# Patient Record
Sex: FEMALE | Race: WHITE | NOT HISPANIC OR LATINO | URBAN - METROPOLITAN AREA
[De-identification: names, ages, dates, MRNs, and addresses within clinical notes are randomized per-mention and may not be internally consistent; named-entity substitution may affect disease eponyms.]

---

## 2021-11-30 ENCOUNTER — OUTPATIENT (OUTPATIENT)
Dept: OUTPATIENT SERVICES | Facility: HOSPITAL | Age: 41
LOS: 1 days | End: 2021-11-30

## 2023-01-26 PROBLEM — Z00.00 ENCOUNTER FOR PREVENTIVE HEALTH EXAMINATION: Status: ACTIVE | Noted: 2023-01-26

## 2023-02-02 ENCOUNTER — APPOINTMENT (OUTPATIENT)
Dept: CARDIOLOGY | Facility: CLINIC | Age: 43
End: 2023-02-02
Payer: COMMERCIAL

## 2023-02-02 VITALS
HEART RATE: 72 BPM | WEIGHT: 186 LBS | OXYGEN SATURATION: 98 % | HEIGHT: 63 IN | SYSTOLIC BLOOD PRESSURE: 120 MMHG | DIASTOLIC BLOOD PRESSURE: 80 MMHG | BODY MASS INDEX: 32.96 KG/M2

## 2023-02-02 DIAGNOSIS — Z78.9 OTHER SPECIFIED HEALTH STATUS: ICD-10-CM

## 2023-02-02 DIAGNOSIS — Z09 ENCOUNTER FOR FOLLOW-UP EXAMINATION AFTER COMPLETED TREATMENT FOR CONDITIONS OTHER THAN MALIGNANT NEOPLASM: ICD-10-CM

## 2023-02-02 DIAGNOSIS — R07.9 CHEST PAIN, UNSPECIFIED: ICD-10-CM

## 2023-02-02 DIAGNOSIS — Z95.828 PRESENCE OF OTHER VASCULAR IMPLANTS AND GRAFTS: ICD-10-CM

## 2023-02-02 DIAGNOSIS — J98.19 OTHER PULMONARY COLLAPSE: ICD-10-CM

## 2023-02-02 DIAGNOSIS — Z87.898 PERSONAL HISTORY OF OTHER SPECIFIED CONDITIONS: ICD-10-CM

## 2023-02-02 DIAGNOSIS — Z87.820 PERSONAL HISTORY OF TRAUMATIC BRAIN INJURY: ICD-10-CM

## 2023-02-02 DIAGNOSIS — M94.0 CHONDROCOSTAL JUNCTION SYNDROME [TIETZE]: ICD-10-CM

## 2023-02-02 PROCEDURE — 99204 OFFICE O/P NEW MOD 45 MIN: CPT

## 2023-02-02 RX ORDER — CYCLOBENZAPRINE HYDROCHLORIDE 5 MG/1
5 TABLET, FILM COATED ORAL
Refills: 0 | Status: ACTIVE | COMMUNITY

## 2023-02-02 NOTE — PHYSICAL EXAM
[Normal S1, S2] : normal S1, S2 [Murmur] : murmur [Normal] : moves all extremities, no focal deficits, normal speech [de-identified] : No carotid bruits auscultated bilaterally [de-identified] : faint systolic ejection murmur base of heart
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
## 2023-02-02 NOTE — REVIEW OF SYSTEMS
[Joint Pain] : joint pain [Joint Stiffness] : joint stiffness [Negative] : Gastrointestinal [FreeTextEntry3] : see HPI [FreeTextEntry5] : see HPI [de-identified] : see HPI

## 2023-02-02 NOTE — DISCUSSION/SUMMARY
[FreeTextEntry1] : 1. Chest Pain: appears non-cardiac. Given the history of the IVC filter and no antiplatelet therapy, I am ordering a CTA of the chest to evaluate for PE. She has a systolic murmur on examination, so I am recommending an echocardiogram as well.\par \par Office will call with results. \par \par Follow up in 1 year.

## 2023-02-24 ENCOUNTER — APPOINTMENT (OUTPATIENT)
Dept: CARDIOLOGY | Facility: CLINIC | Age: 43
End: 2023-02-24

## 2023-03-13 ENCOUNTER — APPOINTMENT (OUTPATIENT)
Dept: CARDIOLOGY | Facility: CLINIC | Age: 43
End: 2023-03-13
Payer: COMMERCIAL

## 2023-03-13 PROCEDURE — 93306 TTE W/DOPPLER COMPLETE: CPT

## 2024-02-06 ENCOUNTER — APPOINTMENT (OUTPATIENT)
Dept: CARDIOLOGY | Facility: CLINIC | Age: 44
End: 2024-02-06

## 2025-05-29 ENCOUNTER — APPOINTMENT (OUTPATIENT)
Dept: ULTRASOUND IMAGING | Facility: CLINIC | Age: 45
End: 2025-05-29
Payer: COMMERCIAL

## 2025-05-30 PROCEDURE — 76536 US EXAM OF HEAD AND NECK: CPT

## 2025-06-04 ENCOUNTER — APPOINTMENT (OUTPATIENT)
Dept: ULTRASOUND IMAGING | Facility: CLINIC | Age: 45
End: 2025-06-04
Payer: COMMERCIAL

## 2025-06-04 PROCEDURE — 76830 TRANSVAGINAL US NON-OB: CPT

## 2025-06-04 PROCEDURE — 76856 US EXAM PELVIC COMPLETE: CPT

## 2025-06-26 ENCOUNTER — APPOINTMENT (OUTPATIENT)
Dept: ULTRASOUND IMAGING | Facility: CLINIC | Age: 45
End: 2025-06-26
Payer: COMMERCIAL

## 2025-06-26 ENCOUNTER — APPOINTMENT (OUTPATIENT)
Dept: MAMMOGRAPHY | Facility: CLINIC | Age: 45
End: 2025-06-26
Payer: COMMERCIAL

## 2025-06-26 PROCEDURE — G0279: CPT

## 2025-06-26 PROCEDURE — 77066 DX MAMMO INCL CAD BI: CPT

## 2025-06-26 PROCEDURE — 76642 ULTRASOUND BREAST LIMITED: CPT | Mod: LT
